# Patient Record
Sex: FEMALE | Race: WHITE | NOT HISPANIC OR LATINO | Employment: FULL TIME | URBAN - METROPOLITAN AREA
[De-identification: names, ages, dates, MRNs, and addresses within clinical notes are randomized per-mention and may not be internally consistent; named-entity substitution may affect disease eponyms.]

---

## 2018-10-23 DIAGNOSIS — E06.3 HYPOTHYROIDISM DUE TO HASHIMOTO'S THYROIDITIS: Primary | ICD-10-CM

## 2018-10-23 DIAGNOSIS — E03.8 HYPOTHYROIDISM DUE TO HASHIMOTO'S THYROIDITIS: Primary | ICD-10-CM

## 2018-11-24 LAB
T4 FREE SERPL-MCNC: 1.2 NG/DL (ref 0.8–1.8)
THYROGLOB AB SERPL-ACNC: >1000 IU/ML
THYROPEROXIDASE AB SERPL-ACNC: 18 IU/ML
TSH SERPL-ACNC: 0.71 MIU/L

## 2018-12-04 ENCOUNTER — OFFICE VISIT (OUTPATIENT)
Dept: ENDOCRINOLOGY | Facility: HOSPITAL | Age: 53
End: 2018-12-04
Payer: COMMERCIAL

## 2018-12-04 VITALS
HEART RATE: 66 BPM | HEIGHT: 62 IN | SYSTOLIC BLOOD PRESSURE: 120 MMHG | WEIGHT: 134.2 LBS | BODY MASS INDEX: 24.69 KG/M2 | DIASTOLIC BLOOD PRESSURE: 84 MMHG

## 2018-12-04 DIAGNOSIS — E06.3 HYPOTHYROIDISM DUE TO HASHIMOTO'S THYROIDITIS: Primary | ICD-10-CM

## 2018-12-04 DIAGNOSIS — E03.8 HYPOTHYROIDISM DUE TO HASHIMOTO'S THYROIDITIS: Primary | ICD-10-CM

## 2018-12-04 PROCEDURE — 99203 OFFICE O/P NEW LOW 30 MIN: CPT | Performed by: INTERNAL MEDICINE

## 2018-12-04 RX ORDER — LEVOTHYROXINE SODIUM 0.1 MG/1
100 TABLET ORAL DAILY
Qty: 90 TABLET | Refills: 3 | Status: SHIPPED | OUTPATIENT
Start: 2018-12-04 | End: 2019-10-01 | Stop reason: SDUPTHER

## 2018-12-04 RX ORDER — PHENOL 1.4 %
AEROSOL, SPRAY (ML) MUCOUS MEMBRANE
COMMUNITY

## 2018-12-04 RX ORDER — LEVOTHYROXINE SODIUM 0.1 MG/1
100 TABLET ORAL DAILY
COMMUNITY
Start: 2018-10-22 | End: 2018-12-04 | Stop reason: SDUPTHER

## 2018-12-04 RX ORDER — CURCUMIN 100 %
POWDER (GRAM) MISCELLANEOUS
COMMUNITY

## 2018-12-04 NOTE — PATIENT INSTRUCTIONS
Thyroid blood work is normal   Continue the same levothyroxine dose  Recheck blood work in 6 months  Follow up 1 year with blood work

## 2018-12-04 NOTE — PROGRESS NOTES
12/4/2018    Assessment/Plan      Diagnoses and all orders for this visit:    Hypothyroidism due to Hashimoto's thyroiditis  -     TSH, 3rd generation Lab Collect; Future  -     T4, free Lab Collect; Future  -     TSH, 3rd generation Lab Collect; Future  -     T4, free Lab Collect; Future  -     levothyroxine 100 mcg tablet; Take 1 tablet (100 mcg total) by mouth daily    Other orders  -     Discontinue: levothyroxine 100 mcg tablet; Take 100 mcg by mouth daily    -     Turmeric, Curcuma Longa, (CURCUMIN) POWD; by Does not apply route Occasionally mixed with apple cider vinegar  -     Multiple Vitamins-Minerals (MULTIVITAMIN ADULTS 50+) TABS; Take by mouth        Assessment/Plan:  Hypothyroidism due to Hashimoto's thyroiditis  Most recent thyroid function tests are normal   She is biochemically euthyroid  She will continue the same levothyroxine 100 mcg daily  Given she has been working on losing weight, I will repeat her blood work in 6 months to make sure we do not miss thyroid abnormalities due to weight loss  This will include a TSH and free T4  I have also asked her to follow up in 1 year with preceding TSH and free T4       CC: Thyroid Consult    History of Present Illness     HPI: Luis Eduardo Javier is a 46y o  year old female with history of hypothyroidism due to Hashimoto's thyroiditis  She was found to have hypothyroidism soon after a child was born around 0  She is on levothyroxine and has not had a dosage change recently  She is on levothyroxine 100 mcg daily  She has been losing weight and bike riding all summer  She has lost about 20 lb  She denies heat or cold intolerance, palpitation, tremors, anxiety or depression  She has no insomnia or fatigue  She has had some recent looser stools with the dietary change but no constipation  She has dry skin but no brittle nails or hair loss    Her menstrual cycles have been absent for a year and a half, but she has had vaginal bleeding recently and had endometrial biopsy today  Had some Gi issues prior to this  She denies diplopia  She denies compressive thyroid symptoms or difficulties with swallowing  She has no history of head or neck irradiation in the past     Review of Systems   Constitutional: Positive for unexpected weight change  Negative for fatigue  Weight loss about 20 lbs  Has been riding a bike  HENT: Negative for hearing loss, tinnitus and trouble swallowing  No XRT to the head or neck in the past    Eyes: Negative for visual disturbance  No diplopia  Wears contacts  Respiratory: Negative for chest tightness and shortness of breath  Cardiovascular: Negative for chest pain, palpitations and leg swelling  Gastrointestinal: Positive for diarrhea  Negative for abdominal pain, constipation and nausea  Has loose lighter bowel movements  Endocrine: Negative for cold intolerance and heat intolerance  Genitourinary:        No menses for 1 1/2 years  Recent vaginal spotting this week  Musculoskeletal: Negative for arthralgias and back pain  Some low back pain with vaginal bleeding  Had some anal pain  Skin: Negative for rash  Has dry skin, but no brittle nails or hair loss  Neurological: Negative for dizziness, tremors, light-headedness, numbness and headaches  Psychiatric/Behavioral: Negative for dysphoric mood and sleep disturbance  The patient is not nervous/anxious          Historical Information   Past Medical History:   Diagnosis Date    Breast cancer ([facility] [address] ) 08/2016    had mastectomy with reconstruction, invasive tubular and ductal carcinoma     Past Surgical History:   Procedure Laterality Date    ENDOMETRIAL BIOPSY  12/04/2018    LAPAROSCOPY      MASTECTOMY Left     with reconstruction     Social History   History   Alcohol Use    Yes     Comment: occasional     History   Drug Use No     History   Smoking Status    Former Smoker    Years: 15 00    Types: Cigarettes    Quit date: 1993   Smokeless Tobacco    Never Used     Family History:   Family History   Problem Relation Age of Onset    Lung cancer Mother     Hypothyroidism Mother     Diabetes type II Mother     Hypertension Father     No Known Problems Sister     No Known Problems Brother     No Known Problems Brother     Heart disease Brother     No Known Problems Sister     No Known Problems Son     No Known Problems Daughter        Meds/Allergies   Current Outpatient Prescriptions   Medication Sig Dispense Refill    levothyroxine 100 mcg tablet Take 1 tablet (100 mcg total) by mouth daily 90 tablet 3    Multiple Vitamins-Minerals (MULTIVITAMIN ADULTS 50+) TABS Take by mouth      Turmeric, Curcuma Longa, (CURCUMIN) POWD by Does not apply route Occasionally mixed with apple cider vinegar       No current facility-administered medications for this visit  Allergies   Allergen Reactions    Tamoxifen Headache and Arthralgia       Objective   Vitals: Blood pressure 120/84, pulse 66, height 5' 2" (1 575 m), weight 60 9 kg (134 lb 3 2 oz)  Invasive Devices          No matching active lines, drains, or airways          Physical Exam   Constitutional: She is oriented to person, place, and time  She appears well-developed and well-nourished  HENT:   Head: Normocephalic and atraumatic  Eyes: Pupils are equal, round, and reactive to light  Conjunctivae and EOM are normal    No lid lag, stare, proptosis, or periorbital edema  Neck: Normal range of motion  Neck supple  No thyromegaly present  Thyroid normal in size without palpable thyroid nodules  No bruits over the thyroid gland or carotids  Cardiovascular: Normal rate, regular rhythm, normal heart sounds and intact distal pulses  No murmur heard  Pulmonary/Chest: Effort normal and breath sounds normal  She has no wheezes  Abdominal: Soft  Bowel sounds are normal  There is no tenderness  Musculoskeletal: Normal range of motion   She exhibits no edema or deformity  No tremor of the outstretched hands  No spinous process tenderness  No CVA tenderness  Lymphadenopathy:     She has no cervical adenopathy  Neurological: She is alert and oriented to person, place, and time  She has normal reflexes  Skin: Skin is warm and dry  No rash noted  Vitals reviewed  The history was obtained from the review of the chart and from the patient      Lab Results:      Blood work done on 11/23/2018 demonstrated thyroid peroxidase antibodies that were positive at 18 and thyroglobulin antibodies that were positive at over 1000 along with the following blood work  Lab Results   Component Value Date    TSH 0 71 11/23/2018    FREET4 1 2 11/23/2018             Future Appointments  Date Time Provider Jono Blood   12/13/2019 8:00 AM Yan Leija MD [facility]

## 2018-12-04 NOTE — LETTER
December 4, 2018     Mary Lee, [address] [place] [address]    Patient: Marty Wall   YOB: 1965   Date of Visit: 12/4/2018       Dear Dr Randall Mejia:    Thank you for referring Marty Wall to me for evaluation  Below are my notes for this consultation  If you have questions, please do not hesitate to call me  I look forward to following your patient along with you  Sincerely,        Tex Kearney MD        CC: No Recipients  Tex Kearney MD  12/4/2018  5:35 PM  Sign at close encounter  12/4/2018    Assessment/Plan      Diagnoses and all orders for this visit:    Hypothyroidism due to Hashimoto's thyroiditis  -     TSH, 3rd generation Lab Collect; Future  -     T4, free Lab Collect; Future  -     TSH, 3rd generation Lab Collect; Future  -     T4, free Lab Collect; Future  -     levothyroxine 100 mcg tablet; Take 1 tablet (100 mcg total) by mouth daily    Other orders  -     Discontinue: levothyroxine 100 mcg tablet; Take 100 mcg by mouth daily    -     Turmeric, Curcuma Longa, (CURCUMIN) POWD; by Does not apply route Occasionally mixed with apple cider vinegar  -     Multiple Vitamins-Minerals (MULTIVITAMIN ADULTS 50+) TABS; Take by mouth        Assessment/Plan:  Hypothyroidism due to Hashimoto's thyroiditis  Most recent thyroid function tests are normal   She is biochemically euthyroid  She will continue the same levothyroxine 100 mcg daily  Given she has been working on losing weight, I will repeat her blood work in 6 months to make sure we do not miss thyroid abnormalities due to weight loss  This will include a TSH and free T4  I have also asked her to follow up in 1 year with preceding TSH and free T4       CC: Thyroid Consult    History of Present Illness     HPI: Marty Wall is a 46y o  year old female with history of hypothyroidism due to Hashimoto's thyroiditis  She was found to have hypothyroidism soon after a child was born around 0   She is on levothyroxine and has not had a dosage change recently  She is on levothyroxine 100 mcg daily  She has been losing weight and bike riding all summer  She has lost about 20 lb  She denies heat or cold intolerance, palpitation, tremors, anxiety or depression  She has no insomnia or fatigue  She has had some recent looser stools with the dietary change but no constipation  She has dry skin but no brittle nails or hair loss  Her menstrual cycles have been absent for a year and a half, but she has had vaginal bleeding recently and had endometrial biopsy today  Had some Gi issues prior to this  She denies diplopia  She denies compressive thyroid symptoms or difficulties with swallowing  She has no history of head or neck irradiation in the past     Review of Systems   Constitutional: Positive for unexpected weight change  Negative for fatigue  Weight loss about 20 lbs  Has been riding a bike  HENT: Negative for hearing loss, tinnitus and trouble swallowing  No XRT to the head or neck in the past    Eyes: Negative for visual disturbance  No diplopia  Wears contacts  Respiratory: Negative for chest tightness and shortness of breath  Cardiovascular: Negative for chest pain, palpitations and leg swelling  Gastrointestinal: Positive for diarrhea  Negative for abdominal pain, constipation and nausea  Has loose lighter bowel movements  Endocrine: Negative for cold intolerance and heat intolerance  Genitourinary:        No menses for 1 1/2 years  Recent vaginal spotting this week  Musculoskeletal: Negative for arthralgias and back pain  Some low back pain with vaginal bleeding  Had some anal pain  Skin: Negative for rash  Has dry skin, but no brittle nails or hair loss  Neurological: Negative for dizziness, tremors, light-headedness, numbness and headaches  Psychiatric/Behavioral: Negative for dysphoric mood and sleep disturbance  The patient is not nervous/anxious  Historical Information   Past Medical History:   Diagnosis Date    Breast cancer ([facility] [address] ) 08/2016    had mastectomy with reconstruction, invasive tubular and ductal carcinoma     Past Surgical History:   Procedure Laterality Date    ENDOMETRIAL BIOPSY  12/04/2018    LAPAROSCOPY      MASTECTOMY Left     with reconstruction     Social History   History   Alcohol Use    Yes     Comment: occasional     History   Drug Use No     History   Smoking Status    Former Smoker    Years: 15 00    Types: Cigarettes    Quit date: 1993   Smokeless Tobacco    Never Used     Family History:   Family History   Problem Relation Age of Onset    Lung cancer Mother     Hypothyroidism Mother     Diabetes type II Mother     Hypertension Father     No Known Problems Sister     No Known Problems Brother     No Known Problems Brother     Heart disease Brother     No Known Problems Sister     No Known Problems Son     No Known Problems Daughter        Meds/Allergies   Current Outpatient Prescriptions   Medication Sig Dispense Refill    levothyroxine 100 mcg tablet Take 1 tablet (100 mcg total) by mouth daily 90 tablet 3    Multiple Vitamins-Minerals (MULTIVITAMIN ADULTS 50+) TABS Take by mouth      Turmeric, Curcuma Longa, (CURCUMIN) POWD by Does not apply route Occasionally mixed with apple cider vinegar       No current facility-administered medications for this visit  Allergies   Allergen Reactions    Tamoxifen Headache and Arthralgia       Objective   Vitals: Blood pressure 120/84, pulse 66, height 5' 2" (1 575 m), weight 60 9 kg (134 lb 3 2 oz)  Invasive Devices          No matching active lines, drains, or airways          Physical Exam   Constitutional: She is oriented to person, place, and time  She appears well-developed and well-nourished  HENT:   Head: Normocephalic and atraumatic  Eyes: Pupils are equal, round, and reactive to light   Conjunctivae and EOM are normal    No lid lag, stare, proptosis, or periorbital edema  Neck: Normal range of motion  Neck supple  No thyromegaly present  Thyroid normal in size without palpable thyroid nodules  No bruits over the thyroid gland or carotids  Cardiovascular: Normal rate, regular rhythm, normal heart sounds and intact distal pulses  No murmur heard  Pulmonary/Chest: Effort normal and breath sounds normal  She has no wheezes  Abdominal: Soft  Bowel sounds are normal  There is no tenderness  Musculoskeletal: Normal range of motion  She exhibits no edema or deformity  No tremor of the outstretched hands  No spinous process tenderness  No CVA tenderness  Lymphadenopathy:     She has no cervical adenopathy  Neurological: She is alert and oriented to person, place, and time  She has normal reflexes  Skin: Skin is warm and dry  No rash noted  Vitals reviewed  The history was obtained from the review of the chart and from the patient      Lab Results:      Blood work done on 11/23/2018 demonstrated thyroid peroxidase antibodies that were positive at 18 and thyroglobulin antibodies that were positive at over 1000 along with the following blood work  Lab Results   Component Value Date    TSH 0 71 11/23/2018    FREET4 1 2 11/23/2018             Future Appointments  Date Time Provider Jono Blood   12/13/2019 8:00 AM Yan Leija MD [facility]

## 2019-10-01 DIAGNOSIS — E03.8 HYPOTHYROIDISM DUE TO HASHIMOTO'S THYROIDITIS: ICD-10-CM

## 2019-10-01 DIAGNOSIS — E06.3 HYPOTHYROIDISM DUE TO HASHIMOTO'S THYROIDITIS: ICD-10-CM

## 2019-10-01 RX ORDER — LEVOTHYROXINE SODIUM 0.1 MG/1
100 TABLET ORAL DAILY
Qty: 90 TABLET | Refills: 3 | Status: SHIPPED | OUTPATIENT
Start: 2019-10-01 | End: 2020-01-21 | Stop reason: SDUPTHER

## 2019-12-03 ENCOUNTER — TELEPHONE (OUTPATIENT)
Dept: ENDOCRINOLOGY | Facility: HOSPITAL | Age: 54
End: 2019-12-03

## 2019-12-03 DIAGNOSIS — E03.8 HYPOTHYROIDISM DUE TO HASHIMOTO'S THYROIDITIS: Primary | ICD-10-CM

## 2019-12-03 DIAGNOSIS — E06.3 HYPOTHYROIDISM DUE TO HASHIMOTO'S THYROIDITIS: Primary | ICD-10-CM

## 2020-01-21 ENCOUNTER — OFFICE VISIT (OUTPATIENT)
Dept: ENDOCRINOLOGY | Facility: HOSPITAL | Age: 55
End: 2020-01-21
Payer: COMMERCIAL

## 2020-01-21 VITALS
BODY MASS INDEX: 26.06 KG/M2 | WEIGHT: 141.6 LBS | HEIGHT: 62 IN | HEART RATE: 88 BPM | SYSTOLIC BLOOD PRESSURE: 160 MMHG | DIASTOLIC BLOOD PRESSURE: 90 MMHG

## 2020-01-21 DIAGNOSIS — E06.3 HYPOTHYROIDISM DUE TO HASHIMOTO'S THYROIDITIS: Primary | ICD-10-CM

## 2020-01-21 DIAGNOSIS — E03.8 HYPOTHYROIDISM DUE TO HASHIMOTO'S THYROIDITIS: Primary | ICD-10-CM

## 2020-01-21 PROCEDURE — 99213 OFFICE O/P EST LOW 20 MIN: CPT | Performed by: INTERNAL MEDICINE

## 2020-01-21 RX ORDER — LEVOTHYROXINE SODIUM 0.1 MG/1
100 TABLET ORAL DAILY
Qty: 90 TABLET | Refills: 3 | Status: SHIPPED | OUTPATIENT
Start: 2020-01-21 | End: 2021-01-15

## 2020-01-21 NOTE — PROGRESS NOTES
1/21/2020    Assessment/Plan      Diagnoses and all orders for this visit:    Hypothyroidism due to Hashimoto's thyroiditis  -     TSH, 3rd generation Lab Collect; Future  -     T4, free Lab Collect; Future  -     levothyroxine 100 mcg tablet; Take 1 tablet (100 mcg total) by mouth daily        Assessment/Plan:  Hypothyroidism due to Hashimoto's thyroiditis  Most recent thyroid function tests are normal   She is biochemically and clinically euthyroid  She will continue the same levothyroxine 100 mcg daily  I have asked her to follow up in 1 year with preceding TSH and free T4       CC:  Hypothyroid follow-up    History of Present Illness     HPI: Delma Puga is a 53y o  year old female with history of hypothyroidism due to Hashimoto's thyroiditis here for follow-up  She was found to have hypothyroidism soon after child was born around 0  She has been on thyroid hormone replacement ever since  She is currently taking levothyroxine 100 mcg daily  She is somewhat fatigued and falls asleep at work at times  Weight is 7 lb more than last year but she admits she has been exercising less  She denies heat or cold intolerance, palpitation, tremors, anxiety or depression, diarrhea or constipation  She has dry skin but no brittle nails or hair loss  She denies insomnia  She has no diplopia  She has no compressive thyroid symptoms or difficulties with swallowing  Review of Systems   Constitutional: Positive for fatigue  Negative for unexpected weight change  Some falling asleep at work  Weight 7 lbs more than last year, has been exercising less  HENT: Negative for trouble swallowing  Eyes: Negative for visual disturbance  No diplopia  Respiratory: Negative for chest tightness and shortness of breath  Cardiovascular: Negative for chest pain and palpitations  Gastrointestinal: Negative for abdominal pain, constipation, diarrhea and nausea     Endocrine: Negative for cold intolerance and heat intolerance  Genitourinary:        Postmenopausal    Skin: Negative for rash  Has dry skin  No brittle nails  No hair loss  Neurological: Positive for numbness  Negative for dizziness, tremors, light-headedness and headaches  Hands can be numb in the morning and stiff fingers in am     Psychiatric/Behavioral: Negative for dysphoric mood and sleep disturbance  The patient is not nervous/anxious  Sleeps 6-7 hours a night         Historical Information   Past Medical History:   Diagnosis Date    Breast cancer ([facility] [address] ) 2016    had mastectomy with reconstruction, invasive tubular and ductal carcinoma     Past Surgical History:   Procedure Laterality Date    ENDOMETRIAL BIOPSY  2018    LAPAROSCOPY      MASTECTOMY Left     with reconstruction     Social History   Social History     Substance and Sexual Activity   Alcohol Use Yes    Frequency: 2-3 times a week    Comment: occasional     Social History     Substance and Sexual Activity   Drug Use No     Social History     Tobacco Use   Smoking Status Former Smoker    Years: 15     Types: Cigarettes    Last attempt to quit:     Years since quittin 0   Smokeless Tobacco Never Used     Family History:   Family History   Problem Relation Age of Onset    Lung cancer Mother     Hypothyroidism Mother     Diabetes type II Mother     Hypertension Father     No Known Problems Sister     No Known Problems Brother     No Known Problems Brother     Heart disease Brother     No Known Problems Sister     No Known Problems Son     No Known Problems Daughter        Meds/Allergies   Current Outpatient Medications   Medication Sig Dispense Refill    levothyroxine 100 mcg tablet Take 1 tablet (100 mcg total) by mouth daily 90 tablet 3    Multiple Vitamins-Minerals (MULTIVITAMIN ADULTS 50+) TABS Take by mouth      Turmeric, Curcuma Longa, (CURCUMIN) POWD Take by mouth Occasionally mixed with apple cider vinegar        No current facility-administered medications for this visit  Allergies   Allergen Reactions    Tamoxifen Headache and Arthralgia       Objective   Vitals: Blood pressure 160/90, pulse 88, height 5' 2" (1 575 m), weight 64 2 kg (141 lb 9 6 oz)  Invasive Devices     None                 Physical Exam   Constitutional: She is oriented to person, place, and time  She appears well-developed and well-nourished  HENT:   Head: Normocephalic and atraumatic  Eyes: Pupils are equal, round, and reactive to light  Conjunctivae and EOM are normal    No lid lag, stare, proptosis, or periorbital edema  Neck: Normal range of motion  Neck supple  No thyromegaly present  Thyroid normal in size without palpable thyroid nodules  No carotid bruits  Cardiovascular: Normal rate, regular rhythm and normal heart sounds  No murmur heard  BP recheck 128/80  Pulmonary/Chest: Effort normal and breath sounds normal  She has no wheezes  Musculoskeletal: Normal range of motion  She exhibits no edema or deformity  No tremor of the outstretched hands  Lymphadenopathy:     She has no cervical adenopathy  Neurological: She is alert and oriented to person, place, and time  She has normal reflexes  Deep tendon reflexes normal    Skin: Skin is warm and dry  No rash noted  Vitals reviewed  The history was obtained from the review of the chart and from the patient  Lab Results:    Blood work done on 01/11/2020 at [email or web address] showed a TSH of 1 38 with a free T4 of 1 16      Future Appointments   Date Time Provider Jono Blood   1/19/2021  8:00 AM Alyssa Louis MD [facility]

## 2020-01-21 NOTE — PATIENT INSTRUCTIONS
The thyroid blood work is normal   Continue the same levothyroxine 100 mcg daily  Follow up in 1 year with blood work

## 2020-12-17 ENCOUNTER — TELEPHONE (OUTPATIENT)
Dept: ENDOCRINOLOGY | Facility: HOSPITAL | Age: 55
End: 2020-12-17

## 2020-12-17 DIAGNOSIS — E03.8 HYPOTHYROIDISM DUE TO HASHIMOTO'S THYROIDITIS: Primary | ICD-10-CM

## 2020-12-17 DIAGNOSIS — E06.3 HYPOTHYROIDISM DUE TO HASHIMOTO'S THYROIDITIS: Primary | ICD-10-CM

## 2021-01-12 ENCOUNTER — APPOINTMENT (OUTPATIENT)
Dept: LAB | Facility: CLINIC | Age: 56
End: 2021-01-12
Payer: COMMERCIAL

## 2021-01-12 DIAGNOSIS — E06.3 HYPOTHYROIDISM DUE TO HASHIMOTO'S THYROIDITIS: ICD-10-CM

## 2021-01-12 DIAGNOSIS — E03.8 HYPOTHYROIDISM DUE TO HASHIMOTO'S THYROIDITIS: ICD-10-CM

## 2021-01-12 LAB
T4 FREE SERPL-MCNC: 1.1 NG/DL (ref 0.76–1.46)
TSH SERPL DL<=0.05 MIU/L-ACNC: 3.44 UIU/ML (ref 0.36–3.74)

## 2021-01-12 PROCEDURE — 84443 ASSAY THYROID STIM HORMONE: CPT

## 2021-01-12 PROCEDURE — 36415 COLL VENOUS BLD VENIPUNCTURE: CPT

## 2021-01-12 PROCEDURE — 84439 ASSAY OF FREE THYROXINE: CPT

## 2021-01-15 DIAGNOSIS — E03.8 HYPOTHYROIDISM DUE TO HASHIMOTO'S THYROIDITIS: ICD-10-CM

## 2021-01-15 DIAGNOSIS — E06.3 HYPOTHYROIDISM DUE TO HASHIMOTO'S THYROIDITIS: ICD-10-CM

## 2021-01-15 RX ORDER — LEVOTHYROXINE SODIUM 0.1 MG/1
TABLET ORAL
Qty: 90 TABLET | Refills: 3 | Status: SHIPPED | OUTPATIENT
Start: 2021-01-15 | End: 2022-02-01

## 2021-01-19 ENCOUNTER — OFFICE VISIT (OUTPATIENT)
Dept: ENDOCRINOLOGY | Facility: HOSPITAL | Age: 56
End: 2021-01-19
Payer: COMMERCIAL

## 2021-01-19 VITALS
WEIGHT: 149 LBS | SYSTOLIC BLOOD PRESSURE: 124 MMHG | HEIGHT: 62 IN | DIASTOLIC BLOOD PRESSURE: 84 MMHG | BODY MASS INDEX: 27.42 KG/M2 | HEART RATE: 84 BPM

## 2021-01-19 DIAGNOSIS — E06.3 HYPOTHYROIDISM DUE TO HASHIMOTO'S THYROIDITIS: Primary | ICD-10-CM

## 2021-01-19 DIAGNOSIS — E03.8 HYPOTHYROIDISM DUE TO HASHIMOTO'S THYROIDITIS: Primary | ICD-10-CM

## 2021-01-19 PROCEDURE — 99214 OFFICE O/P EST MOD 30 MIN: CPT | Performed by: PHYSICIAN ASSISTANT

## 2021-01-19 RX ORDER — LEVOTHYROXINE SODIUM 112 UG/1
112 TABLET ORAL DAILY
Qty: 90 TABLET | Refills: 3 | Status: SHIPPED | OUTPATIENT
Start: 2021-01-19 | End: 2021-11-19

## 2021-01-19 NOTE — PATIENT INSTRUCTIONS
Increased levothyroxine to 112 mcg daily  Get lab work in 6 weeks  Follow-up in 1 year with lab work completed prior to visit  Contact the office if there is any change in symptoms

## 2021-03-02 ENCOUNTER — APPOINTMENT (OUTPATIENT)
Dept: LAB | Facility: CLINIC | Age: 56
End: 2021-03-02
Payer: COMMERCIAL

## 2021-03-02 DIAGNOSIS — E06.3 HYPOTHYROIDISM DUE TO HASHIMOTO'S THYROIDITIS: ICD-10-CM

## 2021-03-02 DIAGNOSIS — E03.8 HYPOTHYROIDISM DUE TO HASHIMOTO'S THYROIDITIS: ICD-10-CM

## 2021-03-02 LAB
T4 FREE SERPL-MCNC: 1.29 NG/DL (ref 0.76–1.46)
TSH SERPL DL<=0.05 MIU/L-ACNC: 0.16 UIU/ML (ref 0.36–3.74)

## 2021-03-02 PROCEDURE — 84439 ASSAY OF FREE THYROXINE: CPT

## 2021-03-02 PROCEDURE — 84443 ASSAY THYROID STIM HORMONE: CPT

## 2021-03-02 PROCEDURE — 36415 COLL VENOUS BLD VENIPUNCTURE: CPT

## 2021-03-04 DIAGNOSIS — E03.8 HYPOTHYROIDISM DUE TO HASHIMOTO'S THYROIDITIS: Primary | ICD-10-CM

## 2021-03-04 DIAGNOSIS — E06.3 HYPOTHYROIDISM DUE TO HASHIMOTO'S THYROIDITIS: Primary | ICD-10-CM

## 2021-11-19 DIAGNOSIS — E06.3 HYPOTHYROIDISM DUE TO HASHIMOTO'S THYROIDITIS: ICD-10-CM

## 2021-11-19 DIAGNOSIS — E03.8 HYPOTHYROIDISM DUE TO HASHIMOTO'S THYROIDITIS: ICD-10-CM

## 2021-11-19 RX ORDER — LEVOTHYROXINE SODIUM 112 UG/1
TABLET ORAL
Qty: 90 TABLET | Refills: 3 | Status: SHIPPED | OUTPATIENT
Start: 2021-11-19 | End: 2022-07-28 | Stop reason: SDUPTHER

## 2021-12-30 ENCOUNTER — TELEPHONE (OUTPATIENT)
Dept: ENDOCRINOLOGY | Facility: HOSPITAL | Age: 56
End: 2021-12-30

## 2021-12-30 NOTE — TELEPHONE ENCOUNTER
Patient has a follow up with you 1/17  She does not have an order for labs  There is an order chart that was due in September which she did not get done  Do you just want those labs or is there anything else you want ordered? She is going to figure out what lab and location she is going to use and call us back so we can fax the order to the lab

## 2022-01-04 LAB
T3FREE SERPL-MCNC: 3.4 PG/ML (ref 2.3–4.2)
T4 FREE SERPL-MCNC: 1.5 NG/DL (ref 0.8–1.8)
TSH SERPL-ACNC: 0.39 MIU/L (ref 0.4–4.5)

## 2022-02-01 ENCOUNTER — OFFICE VISIT (OUTPATIENT)
Dept: ENDOCRINOLOGY | Facility: HOSPITAL | Age: 57
End: 2022-02-01
Payer: COMMERCIAL

## 2022-02-01 VITALS
SYSTOLIC BLOOD PRESSURE: 124 MMHG | WEIGHT: 146 LBS | DIASTOLIC BLOOD PRESSURE: 80 MMHG | HEART RATE: 71 BPM | HEIGHT: 63 IN | BODY MASS INDEX: 25.87 KG/M2

## 2022-02-01 DIAGNOSIS — E03.8 HYPOTHYROIDISM DUE TO HASHIMOTO'S THYROIDITIS: Primary | ICD-10-CM

## 2022-02-01 DIAGNOSIS — E06.3 HYPOTHYROIDISM DUE TO HASHIMOTO'S THYROIDITIS: Primary | ICD-10-CM

## 2022-02-01 PROCEDURE — 99213 OFFICE O/P EST LOW 20 MIN: CPT | Performed by: PHYSICIAN ASSISTANT

## 2022-02-01 NOTE — PROGRESS NOTES
Roberto Diego 61 y o  female MRN: [id number]    Encounter: [id number]      Assessment/Plan     Assessment: This is a 61y o -year-old female with hypothyroidism due to Hashimoto's thyroiditis  Plan:  Hypothyroidism due to Hashimoto's thyroiditis:  Most recent thyroid lab work shows normal free T4, and it ever so slightly low TSH  At this time she is clinically euthyroid  Will not make any adjustments to medication  Did discussed with her symptoms of hyperthyroidism, and contact our office if any symptoms do occur  Follow-up in 1 year with lab work completed prior to visit  CC:  Hypothyroidism follow-up    History of Present Illness     HPI:  Yuki Boyd is a 62 year old female with history of hypothyroidism due to Hashimoto's thyroiditis here for follow-up  Omari Session was found to have hypothyroidism soon after child was born around 0  She has been on thyroid hormone replacement ever since  She is currently taking levothyroxine 112 mcg daily  This was increased after her her last office visit  Has lost 3 lb since her last office visit, and states that she is working out more  [place] Session denies heat or cold intolerance, palpitation, tremors, anxiety or depression, diarrhea or constipation   She has dry skin but no brittle nails or hair loss   She denies insomnia   She has no diplopia   She has no compressive thyroid symptoms or difficulties with swallowing  Overall she is doing well today without any concerns or questions  Review of Systems   Constitutional: Negative for activity change, appetite change, diaphoresis, fatigue and unexpected weight change  HENT: Negative for sore throat, trouble swallowing and voice change  Eyes: Negative for visual disturbance  Respiratory: Negative for chest tightness and shortness of breath  Cardiovascular: Negative for chest pain, palpitations and leg swelling  Gastrointestinal: Negative for abdominal pain, constipation and diarrhea     Endocrine: Negative for cold intolerance, heat intolerance, polydipsia, polyphagia and polyuria  Skin: Negative for rash  Neurological: Negative for dizziness, tremors, light-headedness, numbness and headaches  Hematological: Negative for adenopathy  Psychiatric/Behavioral: Negative for dysphoric mood and sleep disturbance  The patient is not nervous/anxious  Historical Information   Past Medical History:   Diagnosis Date    Breast cancer ([facility] [address] ) 2016    had mastectomy with reconstruction, invasive tubular and ductal carcinoma     Past Surgical History:   Procedure Laterality Date    ENDOMETRIAL BIOPSY  2018    LAPAROSCOPY      MASTECTOMY Left     with reconstruction     Social History   Social History     Substance and Sexual Activity   Alcohol Use Yes    Comment: occasional     Social History     Substance and Sexual Activity   Drug Use No     Social History     Tobacco Use   Smoking Status Former Smoker    Years: 15 00    Types: Cigarettes    Quit date: 45    Years since quittin    Smokeless Tobacco Never Used     Family History:   Family History   Problem Relation Age of Onset    Lung cancer Mother     Hypothyroidism Mother     Diabetes type II Mother     Hypertension Father     No Known Problems Sister     No Known Problems Brother     No Known Problems Brother     Heart disease Brother     No Known Problems Sister     No Known Problems Son     No Known Problems Daughter        Meds/Allergies   Current Outpatient Medications   Medication Sig Dispense Refill    levothyroxine 112 mcg tablet TAKE 1 TABLET BY MOUTH  DAILY 90 tablet 3    Multiple Vitamins-Minerals (MULTIVITAMIN ADULTS 50+) TABS Take by mouth      Turmeric, Curcuma Longa, (CURCUMIN) POWD Take by mouth Occasionally mixed with apple cider vinegar        No current facility-administered medications for this visit  Allergies   Allergen Reactions    Tamoxifen Headache and Arthralgia       Objective   Vitals:  There were no vitals taken for this visit  Physical Exam  Vitals and nursing note reviewed  Constitutional:       General: She is not in acute distress  Appearance: Normal appearance  She is not diaphoretic  HENT:      Head: Normocephalic and atraumatic  Eyes:      General: No scleral icterus  Extraocular Movements: Extraocular movements intact  Conjunctiva/sclera: Conjunctivae normal       Pupils: Pupils are equal, round, and reactive to light  Cardiovascular:      Rate and Rhythm: Normal rate and regular rhythm  Heart sounds: No murmur heard  Pulmonary:      Effort: Pulmonary effort is normal  No respiratory distress  Breath sounds: Normal breath sounds  No wheezing  Musculoskeletal:         General: No swelling  Cervical back: Normal range of motion  Lymphadenopathy:      Cervical: No cervical adenopathy  Neurological:      Mental Status: She is alert and oriented to person, place, and time  Mental status is at baseline  Sensory: No sensory deficit  Gait: Gait normal    Psychiatric:         Mood and Affect: Mood normal          Behavior: Behavior normal          Thought Content: Thought content normal          The history was obtained from the review of the chart, patient  Lab Results:   Lab Results   Component Value Date/Time    TSH 3RD GENERATON 0 160 (L) 03/02/2021 12:56 PM    Free T4 1 29 03/02/2021 12:56 PM    Free t4 1 5 01/03/2022 12:57 PM       Portions of the record may have been created with voice recognition software  Occasional wrong word or "sound a like" substitutions may have occurred due to the inherent limitations of voice recognition software  Read the chart carefully and recognize, using context, where substitutions have occurred

## 2022-02-01 NOTE — PATIENT INSTRUCTIONS
Continue levothyroxine to 112 mcg daily      Follow-up in 1 year with lab work completed prior to visit      Contact the office if there is any change in symptoms

## 2022-07-28 DIAGNOSIS — E03.8 HYPOTHYROIDISM DUE TO HASHIMOTO'S THYROIDITIS: ICD-10-CM

## 2022-07-28 DIAGNOSIS — E06.3 HYPOTHYROIDISM DUE TO HASHIMOTO'S THYROIDITIS: ICD-10-CM

## 2022-07-28 RX ORDER — LEVOTHYROXINE SODIUM 112 UG/1
112 TABLET ORAL DAILY
Qty: 90 TABLET | Refills: 2 | Status: SHIPPED | OUTPATIENT
Start: 2022-07-28

## 2023-01-10 LAB
T3FREE SERPL-MCNC: 2.8 PG/ML (ref 2.3–4.2)
T4 FREE SERPL-MCNC: 1.4 NG/DL (ref 0.8–1.8)
TSH SERPL-ACNC: 0.91 MIU/L (ref 0.4–4.5)

## 2023-01-30 ENCOUNTER — OFFICE VISIT (OUTPATIENT)
Dept: ENDOCRINOLOGY | Facility: HOSPITAL | Age: 58
End: 2023-01-30

## 2023-01-30 ENCOUNTER — TELEPHONE (OUTPATIENT)
Dept: OTHER | Facility: OTHER | Age: 58
End: 2023-01-30

## 2023-01-30 VITALS
DIASTOLIC BLOOD PRESSURE: 86 MMHG | BODY MASS INDEX: 25.62 KG/M2 | HEART RATE: 92 BPM | HEIGHT: 62 IN | WEIGHT: 139.2 LBS | SYSTOLIC BLOOD PRESSURE: 130 MMHG

## 2023-01-30 DIAGNOSIS — E03.8 HYPOTHYROIDISM DUE TO HASHIMOTO'S THYROIDITIS: Primary | ICD-10-CM

## 2023-01-30 DIAGNOSIS — E06.3 HYPOTHYROIDISM DUE TO HASHIMOTO'S THYROIDITIS: Primary | ICD-10-CM

## 2023-01-30 RX ORDER — LEVOTHYROXINE SODIUM 112 UG/1
112 TABLET ORAL DAILY
Qty: 90 TABLET | Refills: 3 | Status: SHIPPED | OUTPATIENT
Start: 2023-01-30
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
## 2023-01-30 NOTE — TELEPHONE ENCOUNTER
Checked with Ashley Booker  Will be too late to be seen & doesn't have any openings until the afternoon  Per Ashley Booker: Told her labs look good, nothing urgent, can reschedule within the next month and to call if she needs any refills  Patient will call back to reschedule
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
## 2023-02-20 ENCOUNTER — OFFICE VISIT (OUTPATIENT)
Dept: DERMATOLOGY | Facility: CLINIC | Age: 58
End: 2023-02-20

## 2023-02-20 VITALS — WEIGHT: 141 LBS | BODY MASS INDEX: 25.95 KG/M2 | HEIGHT: 62 IN | TEMPERATURE: 97.9 F

## 2023-02-20 DIAGNOSIS — I78.1 TELANGIECTASIA: ICD-10-CM

## 2023-02-20 DIAGNOSIS — D22.9 NEVUS SPILUS: ICD-10-CM

## 2023-02-20 DIAGNOSIS — D22.5 MULTIPLE BENIGN MELANOCYTIC NEVI OF UPPER EXTREMITY, LOWER EXTREMITY, AND TRUNK: ICD-10-CM

## 2023-02-20 DIAGNOSIS — D22.60 MULTIPLE BENIGN MELANOCYTIC NEVI OF UPPER EXTREMITY, LOWER EXTREMITY, AND TRUNK: ICD-10-CM

## 2023-02-20 DIAGNOSIS — L81.4 SOLAR LENTIGINOSIS: ICD-10-CM

## 2023-02-20 DIAGNOSIS — L82.1 SEBORRHEIC KERATOSIS: Primary | ICD-10-CM

## 2023-02-20 DIAGNOSIS — D22.70 MULTIPLE BENIGN MELANOCYTIC NEVI OF UPPER EXTREMITY, LOWER EXTREMITY, AND TRUNK: ICD-10-CM

## 2023-02-20 DIAGNOSIS — B35.3 TINEA PEDIS OF BOTH FEET: ICD-10-CM

## 2023-02-20 DIAGNOSIS — D18.01 CHERRY ANGIOMA: ICD-10-CM

## 2023-02-20 NOTE — PATIENT INSTRUCTIONS
SEBORRHEIC KERATOSES  - Relevant exam: Scattered over the trunk/extremities are waxy brown to black plaques and papules with stuck on appearance  - Exam and clinical history consistent with seborrheic keratoses  - Counseled that these are benign growths that do not require treatment  - Counseled that removal of lesions is considered cosmetic and so would incur a fee should patient elect to move forward  - Patient to hold on treatments for now but will inform us should they desire additional treatments    MELANOCYTIC NEVI  -Relevant exam: Scattered over the trunk/extremities are homogenously pigmented brown macules and papules  ELM performed and without concerning findings  Had mole on right upper chest removed  Patient had mastectomy in 2016 for breast cancer  positive BRACA testing     - Exam and clinical history consistent with melanocytic nevi  - Educated on the ABCDE's of melanoma; handout provided  - Counseled to return to clinic prior to scheduled appointment should any of these lesions change or should any new lesions of concern arise  - Counseled on use of sun protection daily  Reviewed latest FDA sunscreen guidelines, including use of broad spectrum (UVA and UVB blocking) sunscreen or sun protective clothing with SPF 30-50 every 2-3 hours and reapplied after exposure to water; use of photoprotective clothing, including a broad brim hat and UPF rated clothing if outdoors for several hours; avoid use of tanning beds as these pose significant risk for melanoma and skin cancer  Yearly skin check     LENTIGINES  OTHER SKIN CHANGES DUE TO CHRONIC EXPOSURE TO NONIONIZING RADIATION  - Relevant exam: Over sun exposed areas are brown macules  ELM performed and without concerning findings  - Exam and clinical history consistent with lentigines  - Educated that these are indicative of prior sun exposure     - Counseled to return to clinic prior to scheduled appointment should any of these lesions change or should any new lesions of concern arise   - Recommended use of sunscreen as above and below  - Counseled on use of sun protection daily  Reviewed latest FDA sunscreen guidelines, including use of broad spectrum (UVA and UVB blocking) sunscreen or sun protective clothing with SPF 30-50 every 2-3 hours and reapplied after exposure to water; use of photoprotective clothing, including a broad brim hat and UPF rated clothing if outdoors for several hours; avoid use of tanning beds as these pose significant risk for melanoma and skin cancer  CHERRY ANGIOMAS  - Relevant exam: Scattered over the trunk/extremities are red papules  - Exam and clinical history consistent with cherry angiomas  - Educated that these are benign  - Educated that removal is considered aesthetic and would incur a fee  - Patient does not wish to pursue removal at this time but will contact us should this change  NEVUS SPILUS      Assessment and Plan:  Based on a thorough discussion of this condition and the management approach to it (including a comprehensive discussion of the known risks, side effects and potential benefits of treatment), the patient (family) agrees to implement the following specific plan:  Reassured benign     A nevus spilus (NS) or speckled lentiginous nevus (SLN) typically presents before the age of 2 as a light brown macule or patch containing smaller, more darkly pigmented macules or papules within the borders  These smaller pigmented aspects may appear after the first background patch is noted  NS is thought by most but not all authors to represent a type of congenital melanocytic nevus  Typically a NS is a benign, isolated lesion with limited dimensions and can present anywhere on the body  NS occurs in roughly 2% of the general population  There is no reported gender or racial preference for development of NS      While NSM lesions tend to have a stable appearance and do not change much throughout life, NSP lesions often present as light tan patches present at birth that then develop dark brown macules and papules as the patient progresses from childhood to adulthood  The macules and papules in NSP can change in size and color with aging, as well  NS is a benign lesion that does not require treatment  Some patients or their parents may be concerned about the cosmetic appearance of the lesion if it is in a cosmetically sensistive area, such as the face  Wide excision may be performed in some cases, but must be balanced against the appearance of a large scar  SPIDER TELANGIECTASIS ("SPIDER ANGIOMA")      Assessment and Plan:  Based on a thorough discussion of this condition and the management approach to it (including a comprehensive discussion of the known risks, side effects and potential benefits of treatment), the patient (family) agrees to implement the following specific plan:  Discussed laser procedure to treat area on face    Spider Telangiectasis  A spider telangiectasis (plural: telangiectases) is composed of dilated blood vessels, and is clinically characterised by its spider-like appearance  It is given that name because it has a central red papule (the body of the 'spider') from which fine red lines (the spider legs) extend radially  An alternative explanation for the name explains that the red lines form a spider-like network  Other names for spider telangiectasis are spider angioma (a misnomer), spider naevus and naevus araneus  A solitary spider telangiectasis is common in children and adults, affecting 10-15% of the population  Although they can affect people of any race, spider telangiectases are more easily seen in fair skin compared to skin of colour   Multiple spider telangiectases arise most frequently in pregnancy, in women taking a combined oral contraceptive pill, in patients with liver disease (particularly, in cirrhosis due to alcohol abuse), and in those with thyrotoxicosis  What causes a spider telangiectasis? Spider telangiectasis is an acquired vascular malformation  It occurs because of the failure of a tiny muscle restricting the size of an arteriole  Increased pulsating flow through the vessel (the central papule) results in the dilatation of distal vessels (the red lines)  Spider telangiectasis may arise spontaneously or may be induced by circulating oestrogen, which is increased in pregnancy, women taking combined oral contraceptives, and in those with liver disease  Various vascular endothelial growth factors may be involved  Spider telangiectases are often located on the face, neck, and upper chest (this has been postulated to relate to the distribution of a large vein draining the heart, the superior vena cava)  Spider telangiectases may also occur on the hands, arms, or other sites  Spider telangiectases vary in size and number, tending to be larger and more numerous in people with severe liver disease when other cutaneous signs of liver disease may be present such as palmar erythema, leukonychia, and jaundice  A central dilated arteriole may be present without radial capillaries, and the capillaries may vary in diameter, length, and number  They can also be star-shaped  The lesions may briefly bleed on trauma but otherwise do not cause any symptoms or complications  A spider telangiectasis is diagnosed by its typical appearance  Compression of the central arteriole results in the disappearance of the radial capillaries, which rapidly refill when the compression is relieved  This is best seen through a transparent object, such as a glass slide or the lens of a contact dermatoscope  Spider telangiectases are distinct from 'spider veins', which are blue-coloured dilated venules arising on the thighs and lower legs and often associated with varicose veins  What is the treatment for a spider telangiectasis?   A spider telangiectasis is harmless and does not require treatment  A lesion that is unsightly can be removed by destroying the feeding central arteriole, but this may result in a small scar  Treatments to remove spider telangiectasis include:  Cryotherapy  Electrocautery  Intense pulsed light  Vascular laser  Surgical excision is rarely necessary and inevitably leaves a scar  Spider telangiectases can persist or disappear  In women, oestrogen-induced telangiectases often disappear within 6 months after having a baby or of stopping the combined contraceptive pill  They can also reappear after initially successful treatment  TINEA PEDIS ("ATHLETE'S FOOT")      Assessment and Plan:  Based on a thorough discussion of this condition and the management approach to it (including a comprehensive discussion of the known risks, side effects and potential benefits of treatment), the patient (family) agrees to implement the following specific plan:  Start over the counter Lamisil twice a day for 3 weeks and then continue twice a week   Recommended  to wash shoes if worn unsocked  Recommended washing socks in hot water     Tinea Pedis  Tinea pedis is a fungal infection of the foot and is in fact the most common fungal infection  Tinea pedis is caused by dermatophyte fungi with the three most common being Trichophyton (T ) rubrum, T  interdigitale and Epidermophyton floccosum  Tinea pedis most commonly involves the interdigital spaces, known as "athlete's foot " Other typical sites include the toenails, groin, and palms of the hands  There are four major manifestations of tinea pedis including chronic hyperkeratotic, chronic intertriginous, acute ulcerative and vesicobullous   Signs and symptoms include:   Itchiness, redness, and scaling between the toes  Scales covering the soles and sides of the feet  Blisters over the inner aspect of the feet    It is particularly common in hot, tropical, and urban environments where sweating in the feet facilitate fungal growth  Risk factors for development include:  Occlusive footwear  Excessive swearing  Diabetes or other underlying immunosuppression   Poor peripheral circulation     The diagnosis of tinea pedis can usually be made via good history and physical exam due to its characteristic clinical features  Diagnosis can be confirmed by examining skin scrapings under the microscope  Cultures are occasionally done but may not be necessary if fungi are seen under microscopy  Other diagnoses to consider if patients do not respond to therapy include psoriasis, contact dermatitis, and eczema  Tinea pedis can be treated with topical antifungal drugs applied to affected areas on a repeated basis (usually 2 twice a day) for 2 to 4 weeks  Common topical medications include topical ketoconazole, allylamines, butenafine, ciclopirox, and tolnaftate  In cases that do not respond to topical therapy, oral antifungal agents may be used which include terbinafine, itraconazole, fluconazole and griseofulvin  These oral agents are also used to treat tinea capitis (fungal infection of the scalp) and onychomycosis (fungal infection of the nails)  Those with pre-existing liver problems are usually screened for liver function prior to starting oral terbinafine  Tinea pedis can be prevented by making sure feet are clean and dry with protective footwear worn in communal facilities   Other recommendations are:  Using drying foot powders when wearing occlusive shoes   Thoroughly dry shoes and boots prior to wearing them   Making sure to clean contaminated bathroom floors with bleach   Treatment of family members and other close contacts     FACE     Assessment and Plan:  Based on a thorough discussion of this condition and the management approach to it (including a comprehensive discussion of the known risks, side effects and potential benefits of treatment), the patient (family) agrees to implement the following specific plan:  Recommended over the counter Retinoid Spread one pea-sized amount of medication over entire face about one hour before bedtime  Recommended vitamin C in the morning

## 2023-02-20 NOTE — PROGRESS NOTES
[facility] Note     Patient Name: Milton Salinas  Encounter Date: 2/20/2023     Have you been cared for by a [place] [id number] Dermatologist in the last 3 years and, if so, which description applies to you? NO  I am considered a "new" patient and must complete all patient intake questions  I am FEMALE/of child-bearing potential     REVIEW OF SYSTEMS:  Have you recently had or currently have any of the following? · Recent fever or chills? No  · Any non-healing wound? No  · Are you pregnant or planning to become pregnant? No  · Are you currently or planning to be nursing or breast feeding? No   PAST MEDICAL HISTORY:  Have you personally ever had or currently have any of the following? If "YES," then please provide more detail  · Skin cancer (such as Melanoma, Basal Cell Carcinoma, Squamous Cell Carcinoma? No  · Tuberculosis, HIV/AIDS, Hepatitis B or C: YES, treated with medication for TB as a child for possible exposure   · Systemic Immunosuppression such as Diabetes, Biologic or Immunotherapy, Chemotherapy, Organ Transplantation, Bone Marrow Transplantation No  · Radiation Treatment No   · History of breast cancer s/p excision without radiation  No positive BRCA testing   FAMILY HISTORY:  Any "first degree relatives" (parent, brother, sister, or child) with the following? • Skin Cancer, Pancreatic or Other Cancer? YES, mother - lung cancer father -[employer]   PATIENT EXPERIENCE:    • Do you want the Dermatologist to perform a COMPLETE skin exam today including a clinical examination under the "bra and underwear" areas? Yes  • If necessary, do we have your permission to call and leave a detailed message on your Preferred Phone number that includes your specific medical information?   Yes      No Known Allergies   Current Outpatient Medications:   •  levothyroxine 112 mcg tablet, Take 1 tablet (112 mcg total) by mouth daily, Disp: 90 tablet, Rfl: 3  •  Multiple Vitamins-Minerals (MULTIVITAMIN ADULTS 50+) TABS, Take by mouth, Disp: , Rfl:   •  Sod Picosulfate-Mag Ox-Cit Acd (Clenpiq) 10-3 5-12 MG-GM -GM/160ML SOLN, Take 1 kit by mouth see administration instructions Follow-up instructions given at office visit (Patient not taking: Reported on 1/30/2023), Disp: 160 mL, Rfl: 0  •  Turmeric, Curcuma Longa, (CURCUMIN) POWD, Take by mouth Occasionally mixed with apple cider vinegar , Disp: , Rfl:           • Whom besides the patient is providing clinical information about today's encounter?   o NO ADDITIONAL HISTORIAN (patient alone provided history)    Physical Exam and Assessment/Plan by Diagnosis:      SEBORRHEIC KERATOSES  - Relevant exam: Scattered over the trunk/extremities are waxy brown to black plaques and papules with stuck on appearance  - Exam and clinical history consistent with seborrheic keratoses  - Counseled that these are benign growths that do not require treatment  - Counseled that removal of lesions is considered cosmetic and so would incur a fee should patient elect to move forward  - Patient to hold on treatments for now but will inform us should they desire additional treatments    MELANOCYTIC NEVI  DERMAL NEVI  -Relevant exam: Scattered over the trunk/extremities are homogenously pigmented brown macules and papules  ELM performed and without concerning findings  Had mole on right upper chest removed  Patient had mastectomy in 2016 for invasive breast cancer  No radiation  Negative BRCA per history  Currently not being treated for breast cancer   - Exam and clinical history consistent with melanocytic nevi  - Educated on the ABCDE's of melanoma; handout provided  - Counseled to return to clinic prior to scheduled appointment should any of these lesions change or should any new lesions of concern arise  - Counseled on use of sun protection daily   Reviewed latest FDA sunscreen guidelines, including use of broad spectrum (UVA and UVB blocking) sunscreen or sun protective clothing with SPF 30-50 every 2-3 hours and reapplied after exposure to water; use of photoprotective clothing, including a broad brim hat and UPF rated clothing if outdoors for several hours; avoid use of tanning beds as these pose significant risk for melanoma and skin cancer  Yearly skin check     LENTIGINES  OTHER SKIN CHANGES DUE TO CHRONIC EXPOSURE TO NONIONIZING RADIATION  - Relevant exam: Over sun exposed areas are brown macules  ELM performed and without concerning findings  - Exam and clinical history consistent with lentigines  - Educated that these are indicative of prior sun exposure  - Counseled to return to clinic prior to scheduled appointment should any of these lesions change or should any new lesions of concern arise   - Recommended use of sunscreen as above and below  - Counseled on use of sun protection daily  Reviewed latest FDA sunscreen guidelines, including use of broad spectrum (UVA and UVB blocking) sunscreen or sun protective clothing with SPF 30-50 every 2-3 hours and reapplied after exposure to water; use of photoprotective clothing, including a broad brim hat and UPF rated clothing if outdoors for several hours; avoid use of tanning beds as these pose significant risk for melanoma and skin cancer  CHERRY ANGIOMAS  - Relevant exam: Scattered over the trunk/extremities are red papules  - Exam and clinical history consistent with cherry angiomas  - Educated that these are benign  - Educated that removal is considered aesthetic and would incur a fee  Could consider PDL laser  - Patient does not wish to pursue removal at this time but will contact us should this change  NEVUS SPILUS    Physical Exam:  • Anatomic Location Affected:  Left lower back   • Morphological Description:  5 cm patch with speckled pigment  • Pertinent Positives:  • Pertinent Negatives:     Additional History of Present Condition:  Present one exam     Assessment and Plan:  Based on a thorough discussion of this condition and the management approach to it (including a comprehensive discussion of the known risks, side effects and potential benefits of treatment), the patient (family) agrees to implement the following specific plan:  • Reassured benign     A nevus spilus (NS) or speckled lentiginous nevus (SLN) typically presents before the age of 2 as a light brown macule or patch containing smaller, more darkly pigmented macules or papules within the borders  These smaller pigmented aspects may appear after the first background patch is noted  NS is thought by most but not all authors to represent a type of congenital melanocytic nevus  Typically a NS is a benign, isolated lesion with limited dimensions and can present anywhere on the body  NS occurs in roughly 2% of the general population  There is no reported gender or racial preference for development of NS  While NSM lesions tend to have a stable appearance and do not change much throughout life, NSP lesions often present as light tan patches present at birth that then develop dark brown macules and papules as the patient progresses from childhood to adulthood  The macules and papules in NSP can change in size and color with aging, as well  NS is a benign lesion that does not require treatment  Some patients or their parents may be concerned about the cosmetic appearance of the lesion if it is in a cosmetically sensistive area, such as the face  Wide excision may be performed in some cases, but must be balanced against the appearance of a large scar  TINEA PEDIS ("ATHLETE'S FOOT")    Physical Exam:  • Anatomic Location Affected:  Bilateral feet   • Morphological Description:  Interdigital maceration    Additional History of Present Condition:  Present for many years  Pt has tried OTC lamisil twice a day for 2 weeks and had some improvement but notes that it recurs  She also washes her shoes frequently      Assessment and Plan:  Based on a thorough discussion of this condition and the management approach to it (including a comprehensive discussion of the known risks, side effects and potential benefits of treatment), the patient (family) agrees to implement the following specific plan:  • Start over the counter Lamisil twice a day for 3 weeks and then continue twice a week as maintenance therapy  • Recommended  to wash shoes if worn without socks  Discussed using hot water to wash socks    Tinea Pedis  Tinea pedis is a fungal infection of the foot and is in fact the most common fungal infection  Tinea pedis is caused by dermatophyte fungi with the three most common being Trichophyton (T ) rubrum, T  interdigitale and Epidermophyton floccosum  Tinea pedis most commonly involves the interdigital spaces, known as "athlete's foot " Other typical sites include the toenails, groin, and palms of the hands  There are four major manifestations of tinea pedis including chronic hyperkeratotic, chronic intertriginous, acute ulcerative and vesicobullous  Signs and symptoms include:   • Itchiness, redness, and scaling between the toes  • Scales covering the soles and sides of the feet  • Blisters over the inner aspect of the feet    It is particularly common in hot, tropical, and urban environments where sweating in the feet facilitate fungal growth  Risk factors for development include:  • Occlusive footwear  • Excessive swearing  • Diabetes or other underlying immunosuppression   • Poor peripheral circulation     The diagnosis of tinea pedis can usually be made via good history and physical exam due to its characteristic clinical features  Diagnosis can be confirmed by examining skin scrapings under the microscope  Cultures are occasionally done but may not be necessary if fungi are seen under microscopy  Other diagnoses to consider if patients do not respond to therapy include psoriasis, contact dermatitis, and eczema      Tinea pedis can be treated with topical antifungal drugs applied to affected areas on a repeated basis (usually 2 twice a day) for 2 to 4 weeks  Common topical medications include topical ketoconazole, allylamines, butenafine, ciclopirox, and tolnaftate  In cases that do not respond to topical therapy, oral antifungal agents may be used which include terbinafine, itraconazole, fluconazole and griseofulvin  These oral agents are also used to treat tinea capitis (fungal infection of the scalp) and onychomycosis (fungal infection of the nails)  Those with pre-existing liver problems are usually screened for liver function prior to starting oral terbinafine  Tinea pedis can be prevented by making sure feet are clean and dry with protective footwear worn in communal facilities   Other recommendations are:  • Using drying foot powders when wearing occlusive shoes   • Thoroughly dry shoes and boots prior to wearing them   • Making sure to clean contaminated bathroom floors with bleach   • Treatment of family members and other close contacts        Scribe Attestation    I,:  Alan Parra am acting as a scribe while in the presence of the attending physician :       I,:  Stewart Cox MD personally performed the services described in this documentation    as scribed in my presence :         Bri Joseph  PGY2 Dermatology Resident

## 2023-05-03 ENCOUNTER — OFFICE VISIT (OUTPATIENT)
Dept: OBGYN CLINIC | Facility: CLINIC | Age: 58
End: 2023-05-03

## 2023-05-03 VITALS
DIASTOLIC BLOOD PRESSURE: 88 MMHG | BODY MASS INDEX: 25.76 KG/M2 | HEIGHT: 62 IN | WEIGHT: 140 LBS | SYSTOLIC BLOOD PRESSURE: 136 MMHG

## 2023-05-03 DIAGNOSIS — Z01.419 WELL WOMAN EXAM WITH ROUTINE GYNECOLOGICAL EXAM: Primary | ICD-10-CM

## 2023-05-03 DIAGNOSIS — Z11.3 SCREENING FOR STD (SEXUALLY TRANSMITTED DISEASE): ICD-10-CM

## 2023-05-03 DIAGNOSIS — Z11.51 SCREENING FOR HPV (HUMAN PAPILLOMAVIRUS): ICD-10-CM

## 2023-05-03 DIAGNOSIS — Z12.4 ENCOUNTER FOR SCREENING FOR MALIGNANT NEOPLASM OF CERVIX: ICD-10-CM

## 2023-05-03 RX ORDER — ATORVASTATIN CALCIUM 40 MG/1
40 TABLET, FILM COATED ORAL EVERY EVENING
COMMUNITY
Start: 2023-04-06

## 2023-05-03 RX ORDER — ASPIRIN 81 MG/1
81 TABLET, CHEWABLE ORAL DAILY
COMMUNITY
Start: 2023-04-06

## 2023-05-03 RX ORDER — LISINOPRIL 5 MG/1
TABLET ORAL
COMMUNITY
Start: 2023-04-05

## 2023-05-03 NOTE — PROGRESS NOTES
ASSESSMENT & PLAN: Daryle Elliot is a 61 y o   with normal gynecologic exam     1   Routine well woman exam done today  2    Pap and HPV:Pap with HPV was done today  Current ASCCP Guidelines reviewed  Last Pap  [unfilled] :  no abnormalities  3  Mammogram ordered  Recommend yearly mammography  4   The patient agreeed to STD testing  chlamydia and gonorrhea testing performed  Safe sex practices have been discussed  5  The patient is sexually active  6  The following were reviewed in today's visit: breast self exam and menopause  7  Patient to return to office in 12 months for WWE  8   LLQ pain  Advised to follow up with neuro and PT for schedule evaluation and treatment of lower back pain  Will return in 4-6 weeks to re-evaluate for pelvic pain  Discussed differential dx of gyn, GI, uro, musculoskeletal causes  Pt  Fibroids have not changed in size x 3 years  Unclear if uterine fibroids are etiology for this more recent onset of pain  Offered hysterectomy, although advised patient this may not resolve her discomfort if not the source  Will revisit at next appt  All questions have been answered to her satisfaction  CC:  Annual Gynecologic Examination    HPI: Daryle Elliot is a 61 y o   who presents for annual gynecologic examination  She has the following concerns:  LLQ that started 4 months ago  Pain is intermittent  States with initial episode the pain was severe, but reports more recently the discomfort has lessened  States she had a recent uneventful colonoscopy  Denies issues with BM or voiding  Hx of uterine fibroids that have been stable from pelvic u/s in  to   Menopause 2017  Denies any PMB bleeding or discharge  Hx of breast Cancer- tx with mastectomy, advised to take tamoxifen but discontinued use  No chemo or rad    Remote hx of dx lap for endometriosis     x 2    Pelvic u/s :  FINDINGS:     LMP: Postmenopausal     The uterus is anteverted and measures 9 4 x 3 5 x 4 8 cm in size  There is a 0 2   cm thick endometrial echo complex The myometrium is heterogeneously hypoechoic   and lobular due to the presence of multiple fibroids as follows:     *  Right fundal intramural fibroid, 5 1 x 4 1 x 4 4 cm  *  Midline ventral body intramural fibroid, 2 3 x 2 0 x 2 2 cm  The right ovary measures 1 5 x 1 4 x 1 4 cm and is normal in echogenicity  There   is preserved vascular flow to the right ovary        The left ovary measures 1 6 x 1 0 5-1 2 cm and is normal in echogenicity  There   is preserved vascular flow to the left ovary        No suspicious adnexal masses are identified  No significant amount of free fluid   is seen within the cul-de-sac  Health Maintenance:    She wears her seatbelt routinely  She does perform regular monthly self breast exams  She feels safe at home  Patients does follow a healthy diet  Past Medical History:   Diagnosis Date    Breast cancer ([facility] [address] ) 2016    had mastectomy with reconstruction, invasive tubular and ductal carcinoma    Concussion 2023    Hypothyroidism        Past Surgical History:   Procedure Laterality Date    COLONOSCOPY      Patient reports normal at another practice    ENDOMETRIAL BIOPSY  2018    LAPAROSCOPY      MASTECTOMY Left     with reconstruction    UPPER GASTROINTESTINAL ENDOSCOPY      At another practice       Past OB/Gyn History:   No LMP recorded  Patient is postmenopausal   Menstrual History:  OB History        2    Para   2    Term                AB        Living   2       SAB        IAB        Ectopic        Multiple        Live Births                    No LMP recorded  Patient is postmenopausal        History of sexually transmitted infection No  Patient is currently sexually active  heterosexual Birth control: none  Last Pap  [unfilled] :  no abnormalities      Family History   Problem Relation Age of Onset    Lung cancer Mother    Anel Comp Hypothyroidism Mother     Diabetes type II Mother     Hypertension Father     No Known Problems Sister     No Known Problems Sister     No Known Problems Brother     No Known Problems Brother     Heart disease Brother     Colon cancer Cousin     No Known Problems Daughter     No Known Problems Son        Social History:  Social History     Socioeconomic History    Marital status:      Spouse name: Not on file    Number of children: Not on file    Years of education: Not on file    Highest education level: Not on file   Occupational History    Occupation: /manager   Tobacco Use    Smoking status: Former     Packs/day: 0 50     Years: 15 00     Pack years: 7 50     Types: Cigarettes     Quit date: 1993     Years since quittin 3    Smokeless tobacco: Never    Tobacco comments:     quit at age 26   Vaping Use    Vaping Use: Never used   Substance and Sexual Activity    Alcohol use:  Yes     Alcohol/week: 2 0 standard drinks     Types: 1 Glasses of wine, 1 Standard drinks or equivalent per week     Comment: occasional    Drug use: No    Sexual activity: Yes     Partners: Male     Birth control/protection: Post-menopausal   Other Topics Concern    Not on file   Social History Narrative    Not on file     Social Determinants of Health     Financial Resource Strain: Not on file   Food Insecurity: Not on file   Transportation Needs: Not on file   Physical Activity: Not on file   Stress: Not on file   Social Connections: Not on file   Intimate Partner Violence: Not on file   Housing Stability: Not on file       No Known Allergies    Current Outpatient Medications:     atorvastatin (LIPITOR) 40 mg tablet, Take 40 mg by mouth every evening, Disp: , Rfl:     levothyroxine 112 mcg tablet, Take 1 tablet (112 mcg total) by mouth daily, Disp: 90 tablet, Rfl: 3    lisinopril (ZESTRIL) 5 mg tablet, , Disp: , Rfl:     Multiple Vitamins-Minerals (MULTIVITAMIN ADULTS 50+) TABS, "Take by mouth, Disp: , Rfl:     SM Aspirin Low Dose 81 MG chewable tablet, Chew 81 mg daily Chew and swallow, Disp: , Rfl:     Sod Picosulfate-Mag Ox-Cit Acd (Clenpiq) 10-3 5-12 MG-GM -GM/160ML SOLN, Take 1 kit by mouth see administration instructions Follow-up instructions given at office visit (Patient not taking: Reported on 1/30/2023), Disp: 160 mL, Rfl: 0    Review of Systems:  Review of Systems   Constitutional: Negative for activity change, chills, fever and unexpected weight change  HENT: Negative for congestion, ear pain, hearing loss and sore throat  Respiratory: Negative for cough, chest tightness and shortness of breath  Cardiovascular: Negative for chest pain and leg swelling  Gastrointestinal: Negative for abdominal pain, constipation, diarrhea, nausea and vomiting  Genitourinary: Positive for pelvic pain  Negative for difficulty urinating, dysuria, frequency, menstrual problem, vaginal discharge and vaginal pain  Skin: Negative for color change and rash  Neurological: Negative for dizziness, numbness and headaches  Psychiatric/Behavioral: Negative for agitation and confusion  Physical Exam:  /88 (BP Location: Right arm, Patient Position: Sitting, Cuff Size: Standard)   Ht 5' 2\" (1 575 m)   Wt 63 5 kg (140 lb)   BMI 25 61 kg/m²    Physical Exam  Constitutional:       General: She is not in acute distress  Appearance: Normal appearance  Genitourinary:      Vulva, bladder, rectum and urethral meatus normal       No lesions in the vagina  Right Labia: No rash, tenderness or lesions  Left Labia: No tenderness, lesions or rash  No labial fusion noted  No vaginal discharge or tenderness  No vaginal prolapse present  No vaginal atrophy present  Vaginal exam comments: Vaginal polyp/ skin tag, 3 o'clock,   Right Adnexa: not tender, not full and no mass present  Left Adnexa: not tender, not full and no mass present       No " cervical motion tenderness or friability  Uterus is not enlarged or prolapsed  Breasts:     Breasts are soft  Right: Breast implant present  No inverted nipple, mass, nipple discharge or skin change  Left: Breast implant present  No inverted nipple, mass, nipple discharge or skin change  HENT:      Head: Normocephalic and atraumatic  Nose: Nose normal    Eyes:      Conjunctiva/sclera: Conjunctivae normal       Pupils: Pupils are equal, round, and reactive to light  Cardiovascular:      Rate and Rhythm: Normal rate and regular rhythm  Pulses: Normal pulses  Heart sounds: Normal heart sounds  Pulmonary:      Effort: Pulmonary effort is normal  No respiratory distress  Breath sounds: Normal breath sounds  No wheezing  Abdominal:      General: Abdomen is flat  There is no distension  Palpations: Abdomen is soft  Tenderness: There is no abdominal tenderness  There is no guarding  Musculoskeletal:         General: Normal range of motion  Cervical back: Normal range of motion and neck supple  Neurological:      General: No focal deficit present  Mental Status: She is alert and oriented to person, place, and time  Skin:     General: Skin is warm and dry  Psychiatric:         Mood and Affect: Mood normal          Behavior: Behavior normal          Thought Content: Thought content normal          Judgment: Judgment normal    Vitals and nursing note reviewed

## 2023-05-05 LAB
C TRACH DNA SPEC QL NAA+PROBE: NEGATIVE
HPV HR 12 DNA CVX QL NAA+PROBE: NEGATIVE
HPV16 DNA CVX QL NAA+PROBE: NEGATIVE
HPV18 DNA CVX QL NAA+PROBE: NEGATIVE
N GONORRHOEA DNA SPEC QL NAA+PROBE: NEGATIVE

## 2023-05-09 LAB
LAB AP GYN PRIMARY INTERPRETATION: NORMAL
Lab: NORMAL

## 2024-01-22 DIAGNOSIS — E06.3 HYPOTHYROIDISM DUE TO HASHIMOTO'S THYROIDITIS: ICD-10-CM

## 2024-01-22 DIAGNOSIS — E03.8 HYPOTHYROIDISM DUE TO HASHIMOTO'S THYROIDITIS: ICD-10-CM

## 2024-01-22 LAB
T3FREE SERPL-MCNC: 3.7 PG/ML (ref 2.3–4.2)
T4 FREE SERPL-MCNC: 1.7 NG/DL (ref 0.8–1.8)
TSH SERPL-ACNC: 0.46 MIU/L (ref 0.4–4.5)

## 2024-01-22 RX ORDER — LEVOTHYROXINE SODIUM 112 UG/1
112 TABLET ORAL DAILY
Qty: 90 TABLET | Refills: 0 | Status: SHIPPED | OUTPATIENT
Start: 2024-01-22 | End: 2024-01-30 | Stop reason: SDUPTHER

## 2024-01-30 ENCOUNTER — OFFICE VISIT (OUTPATIENT)
Dept: ENDOCRINOLOGY | Facility: HOSPITAL | Age: 59
End: 2024-01-30
Payer: COMMERCIAL

## 2024-01-30 VITALS
DIASTOLIC BLOOD PRESSURE: 80 MMHG | SYSTOLIC BLOOD PRESSURE: 124 MMHG | HEART RATE: 70 BPM | WEIGHT: 140.6 LBS | BODY MASS INDEX: 25.88 KG/M2 | HEIGHT: 62 IN

## 2024-01-30 DIAGNOSIS — E03.8 HYPOTHYROIDISM DUE TO HASHIMOTO'S THYROIDITIS: Primary | ICD-10-CM

## 2024-01-30 DIAGNOSIS — E06.3 HYPOTHYROIDISM DUE TO HASHIMOTO'S THYROIDITIS: Primary | ICD-10-CM

## 2024-01-30 PROCEDURE — 99213 OFFICE O/P EST LOW 20 MIN: CPT | Performed by: PHYSICIAN ASSISTANT

## 2024-01-30 RX ORDER — HYDROCHLOROTHIAZIDE 12.5 MG/1
12.5 TABLET ORAL DAILY
COMMUNITY

## 2024-01-30 RX ORDER — LEVOTHYROXINE SODIUM 112 UG/1
112 TABLET ORAL DAILY
Qty: 90 TABLET | Refills: 3 | Status: SHIPPED | OUTPATIENT
Start: 2024-01-30

## 2024-01-30 RX ORDER — ROSUVASTATIN CALCIUM 20 MG/1
20 TABLET, COATED ORAL
COMMUNITY
Start: 2024-01-23

## 2024-01-30 RX ORDER — CHLORAL HYDRATE 500 MG
CAPSULE ORAL DAILY
COMMUNITY

## 2024-01-30 RX ORDER — LOSARTAN POTASSIUM 100 MG/1
100 TABLET ORAL DAILY
COMMUNITY
Start: 2023-06-03

## 2024-01-30 NOTE — PATIENT INSTRUCTIONS
Continue levothyroxine to 112 mcg daily.     Follow-up in 1 year with lab work completed prior to visit.     Contact the office if there is any change in symptoms.
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
## 2024-11-25 ENCOUNTER — TELEPHONE (OUTPATIENT)
Age: 59
End: 2024-11-25

## 2024-11-25 NOTE — TELEPHONE ENCOUNTER
Patient will be emailing her lab results from her pcp to:     [email or web address]

## 2024-11-26 NOTE — TELEPHONE ENCOUNTER
Results of recent set of thyroid lab work.  TSH is running a little bit high.  Has she missed any doses of her levothyroxine recently?  Has she had any recent illnesses, any steroid use, any new over-the-counter supplements?  How is she feeling at this time?
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
## 2024-11-29 NOTE — TELEPHONE ENCOUNTER
Although her symptoms may be related to her concussion, at would like to increase her levothyroxine 112 mcg to 1 tablet 6 days a week and 1.5 tablets on Sunday to see if this does help.  Make sure to get lab work completed prior to next office visit.

## 2025-01-24 LAB
T3FREE SERPL-MCNC: 3.2 PG/ML (ref 2.3–4.2)
T4 FREE SERPL-MCNC: 1.5 NG/DL (ref 0.8–1.8)
TSH SERPL-ACNC: 10.81 MIU/L (ref 0.4–4.5)

## 2025-01-27 ENCOUNTER — RESULTS FOLLOW-UP (OUTPATIENT)
Dept: ENDOCRINOLOGY | Facility: HOSPITAL | Age: 60
End: 2025-01-27

## 2025-01-30 ENCOUNTER — OFFICE VISIT (OUTPATIENT)
Dept: ENDOCRINOLOGY | Facility: HOSPITAL | Age: 60
End: 2025-01-30
Payer: COMMERCIAL

## 2025-01-30 ENCOUNTER — NURSE TRIAGE (OUTPATIENT)
Age: 60
End: 2025-01-30

## 2025-01-30 VITALS
DIASTOLIC BLOOD PRESSURE: 90 MMHG | BODY MASS INDEX: 25.76 KG/M2 | HEART RATE: 74 BPM | SYSTOLIC BLOOD PRESSURE: 132 MMHG | WEIGHT: 140 LBS | HEIGHT: 62 IN

## 2025-01-30 DIAGNOSIS — E06.3 HYPOTHYROIDISM DUE TO HASHIMOTO'S THYROIDITIS: Primary | ICD-10-CM

## 2025-01-30 DIAGNOSIS — S09.90XS TRAUMATIC INJURY OF HEAD, SEQUELA: ICD-10-CM

## 2025-01-30 PROCEDURE — 99214 OFFICE O/P EST MOD 30 MIN: CPT | Performed by: PHYSICIAN ASSISTANT

## 2025-01-30 RX ORDER — LEVOTHYROXINE SODIUM 137 UG/1
137 TABLET ORAL DAILY
Qty: 90 TABLET | Refills: 1 | Status: SHIPPED | OUTPATIENT
Start: 2025-01-30

## 2025-01-30 RX ORDER — LEVOTHYROXINE SODIUM 125 MCG
125 TABLET ORAL DAILY
Qty: 90 TABLET | Refills: 3 | Status: SHIPPED | OUTPATIENT
Start: 2025-01-30 | End: 2025-01-30

## 2025-01-30 NOTE — PATIENT INSTRUCTIONS
Switch to Synthroid 125 mcg daily.    Get lab work in about 6 weeks.    Call the office with any concerns or questions.     Follow-up in 1 year with lab work completed prior to visit.

## 2025-01-30 NOTE — TELEPHONE ENCOUNTER
I sent a new prescription for levothyroxine over the pharmacy.  Since we are continuing with the generic levothyroxine, I increased her dose to 137 mcg daily.

## 2025-01-30 NOTE — TELEPHONE ENCOUNTER
"Please see note below per Patient request via triage que:    \"Patient calling in regards to Synthroid 125 MCG tablet prescription.  States \"If I don't need to be on the name brand I would like Dr Short to send in levothyroxine and not Synthroid because its more cost effective.\"     Please advise- would like new script for generic synthroid sent to [facility] [place]. Patient is requesting a call back with script update. Thank you  "
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
## 2025-03-23 LAB
ACTH PLAS-MCNC: 12 PG/ML (ref 6–50)
ALBUMIN SERPL-MCNC: 4.3 G/DL (ref 3.6–5.1)
ALBUMIN/GLOB SERPL: 1.9 (CALC) (ref 1–2.5)
ALP SERPL-CCNC: 64 U/L (ref 37–153)
ALT SERPL-CCNC: 16 U/L (ref 6–29)
AST SERPL-CCNC: 22 U/L (ref 10–35)
BASOPHILS # BLD AUTO: 69 CELLS/UL (ref 0–200)
BASOPHILS NFR BLD AUTO: 1 %
BILIRUB SERPL-MCNC: 0.7 MG/DL (ref 0.2–1.2)
BUN SERPL-MCNC: 10 MG/DL (ref 7–25)
BUN/CREAT SERPL: NORMAL (CALC) (ref 6–22)
CALCIUM SERPL-MCNC: 10 MG/DL (ref 8.6–10.4)
CHLORIDE SERPL-SCNC: 102 MMOL/L (ref 98–110)
CO2 SERPL-SCNC: 28 MMOL/L (ref 20–32)
CORTIS AM PEAK SERPL-MCNC: 21.7 MCG/DL
CREAT SERPL-MCNC: 0.79 MG/DL (ref 0.5–1.05)
EOSINOPHIL # BLD AUTO: 311 CELLS/UL (ref 15–500)
EOSINOPHIL NFR BLD AUTO: 4.5 %
ERYTHROCYTE [DISTWIDTH] IN BLOOD BY AUTOMATED COUNT: 12.6 % (ref 11–15)
GFR/BSA.PRED SERPLBLD CYS-BASED-ARV: 86 ML/MIN/1.73M2
GLOBULIN SER CALC-MCNC: 2.3 G/DL (CALC) (ref 1.9–3.7)
GLUCOSE SERPL-MCNC: 87 MG/DL (ref 65–99)
HCT VFR BLD AUTO: 43.7 % (ref 35–45)
HGB BLD-MCNC: 14.4 G/DL (ref 11.7–15.5)
IGF-I SERPL-MCNC: 55 NG/ML (ref 41–279)
IGF-I Z-SCORE SERPL: -1.7 SD
LYMPHOCYTES # BLD AUTO: 1994 CELLS/UL (ref 850–3900)
LYMPHOCYTES NFR BLD AUTO: 28.9 %
MCH RBC QN AUTO: 29.4 PG (ref 27–33)
MCHC RBC AUTO-ENTMCNC: 33 G/DL (ref 32–36)
MCV RBC AUTO: 89.4 FL (ref 80–100)
MONOCYTES # BLD AUTO: 462 CELLS/UL (ref 200–950)
MONOCYTES NFR BLD AUTO: 6.7 %
NEUTROPHILS # BLD AUTO: 4064 CELLS/UL (ref 1500–7800)
NEUTROPHILS NFR BLD AUTO: 58.9 %
PLATELET # BLD AUTO: 206 THOUSAND/UL (ref 140–400)
PMV BLD REES-ECKER: 12.5 FL (ref 7.5–12.5)
POTASSIUM SERPL-SCNC: 3.7 MMOL/L (ref 3.5–5.3)
PROLACTIN SERPL-MCNC: 4.8 NG/ML
PROT SERPL-MCNC: 6.6 G/DL (ref 6.1–8.1)
RBC # BLD AUTO: 4.89 MILLION/UL (ref 3.8–5.1)
SODIUM SERPL-SCNC: 139 MMOL/L (ref 135–146)
T3FREE SERPL-MCNC: 4 PG/ML (ref 2.3–4.2)
T4 FREE SERPL-MCNC: 2 NG/DL (ref 0.8–1.8)
TSH SERPL-ACNC: 0.18 MIU/L (ref 0.4–4.5)
WBC # BLD AUTO: 6.9 THOUSAND/UL (ref 3.8–10.8)

## 2025-03-24 ENCOUNTER — RESULTS FOLLOW-UP (OUTPATIENT)
Dept: ENDOCRINOLOGY | Facility: HOSPITAL | Age: 60
End: 2025-03-24

## 2025-03-24 DIAGNOSIS — E06.3 HYPOTHYROIDISM DUE TO HASHIMOTO'S THYROIDITIS: ICD-10-CM

## 2025-03-24 RX ORDER — LEVOTHYROXINE SODIUM 137 UG/1
TABLET ORAL
Qty: 90 TABLET | Refills: 1 | Status: SHIPPED | OUTPATIENT
Start: 2025-03-24 | End: 2025-05-19

## 2025-05-15 LAB
T3FREE SERPL-MCNC: 4.1 PG/ML (ref 2.3–4.2)
T4 FREE SERPL-MCNC: 1.9 NG/DL (ref 0.8–1.8)
TSH SERPL-ACNC: 0.05 MIU/L (ref 0.4–4.5)

## 2025-05-19 ENCOUNTER — RESULTS FOLLOW-UP (OUTPATIENT)
Dept: ENDOCRINOLOGY | Facility: HOSPITAL | Age: 60
End: 2025-05-19

## 2025-05-19 DIAGNOSIS — E06.3 HYPOTHYROIDISM DUE TO HASHIMOTO'S THYROIDITIS: ICD-10-CM

## 2025-05-19 RX ORDER — LEVOTHYROXINE SODIUM 137 UG/1
TABLET ORAL
Qty: 90 TABLET | Refills: 1 | Status: SHIPPED | OUTPATIENT
Start: 2025-05-19 | End: 2025-07-24

## 2025-07-02 LAB
CREAT ?TM UR-SCNC: 39 UMOL/L
EXT ALBUMIN URINE RANDOM: 0.2
MICROALBUMIN/CREAT UR: 5 MG/G{CREAT}
T3FREE SERPL-MCNC: 3.5 PG/ML (ref 2.3–4.2)
T4 FREE SERPL-MCNC: 1.7 NG/DL (ref 0.8–1.8)

## 2025-07-24 DIAGNOSIS — E06.3 HYPOTHYROIDISM DUE TO HASHIMOTO'S THYROIDITIS: ICD-10-CM

## 2025-07-24 RX ORDER — LEVOTHYROXINE SODIUM 137 UG/1
137 TABLET ORAL DAILY
Qty: 90 TABLET | Refills: 1 | Status: SHIPPED | OUTPATIENT
Start: 2025-07-24